# Patient Record
Sex: FEMALE | Race: OTHER | URBAN - METROPOLITAN AREA
[De-identification: names, ages, dates, MRNs, and addresses within clinical notes are randomized per-mention and may not be internally consistent; named-entity substitution may affect disease eponyms.]

---

## 2023-09-10 NOTE — PROGRESS NOTES
HISTORY OF PRESENT ILLNESS  Sourav Light is a 23 y.o. female. [facility] Atrioventricular repair 2003, 2012  ----  CARDIAC STUDIES  ----    Have you had Fatigue? No   2. Have you had have you had Chest Pain? Yes when sneezing if so how long month how frequent unsure . Is   it  substernal? no Pressure like? no Comes on with Activity or with large meals? No , pressure lasts seconds, and is about 3 after sneezing. 3.   Have you had Dyspnea (SOB) ? No  can you walk 2 blocks or a flight of stairs without SOB yes  4. Have you had Orthopnea? No   5. Have you had PND? No   6. Have you had leg swelling? unsure  7. Have you had any weight gain? No   8. Have you had any palpitations? No   9. Have you had any syncope? No   10. Do you have any wounds on legs? no  ----  2003, 2012 had atrioventricular surgical closures per patient [facility]    Discussed with the patient the above and is as such with the above modifications. No family history on file. No past medical history on file. No past surgical history on file. Social History     Tobacco Use    Smoking status: Not on file    Smokeless tobacco: Not on file   Substance Use Topics    Alcohol use: Not on file       Not on File    Prior to Admission medications    Not on File       No results found for: \"LIPIDPAN\", \"BMP\", \"CMP\"     There were no vitals taken for this visit. HPI    Review of Systems   Constitutional:  Negative for activity change, appetite change, diaphoresis, fatigue and unexpected weight change. Eyes:  Negative for visual disturbance. Respiratory:  Positive for chest tightness. Negative for apnea, cough, shortness of breath and wheezing. Cardiovascular:  Negative for chest pain, palpitations and leg swelling. Gastrointestinal:  Negative for abdominal pain, blood in stool, constipation, diarrhea and nausea. Endocrine: Negative for cold intolerance and heat intolerance.    Genitourinary:  Negative for decreased urine volume and

## 2023-09-15 ENCOUNTER — OFFICE VISIT (OUTPATIENT)
Age: 23
End: 2023-09-15
Payer: OTHER GOVERNMENT

## 2023-09-15 VITALS
BODY MASS INDEX: 32.39 KG/M2 | WEIGHT: 176 LBS | HEIGHT: 62 IN | HEART RATE: 66 BPM | DIASTOLIC BLOOD PRESSURE: 58 MMHG | OXYGEN SATURATION: 98 % | SYSTOLIC BLOOD PRESSURE: 111 MMHG

## 2023-09-15 DIAGNOSIS — R07.9 CHEST PAIN, UNSPECIFIED TYPE: Primary | ICD-10-CM

## 2023-09-15 PROCEDURE — 99204 OFFICE O/P NEW MOD 45 MIN: CPT | Performed by: INTERNAL MEDICINE

## 2023-09-15 RX ORDER — NITROFURANTOIN 25; 75 MG/1; MG/1
CAPSULE ORAL
COMMUNITY
Start: 2023-09-14

## 2023-09-15 RX ORDER — CETIRIZINE HYDROCHLORIDE 10 MG/1
TABLET ORAL
COMMUNITY
Start: 2023-09-14

## 2023-09-15 ASSESSMENT — PATIENT HEALTH QUESTIONNAIRE - PHQ9
SUM OF ALL RESPONSES TO PHQ QUESTIONS 1-9: 0
SUM OF ALL RESPONSES TO PHQ QUESTIONS 1-9: 0
DEPRESSION UNABLE TO ASSESS: FUNCTIONAL CAPACITY MOTIVATION LIMITS ACCURACY
SUM OF ALL RESPONSES TO PHQ9 QUESTIONS 1 & 2: 0
SUM OF ALL RESPONSES TO PHQ QUESTIONS 1-9: 0
2. FEELING DOWN, DEPRESSED OR HOPELESS: 0
1. LITTLE INTEREST OR PLEASURE IN DOING THINGS: 0
SUM OF ALL RESPONSES TO PHQ QUESTIONS 1-9: 0

## 2023-09-15 NOTE — PROGRESS NOTES
Have you had Fatigue? No   2. Have you had have you had Chest Pain? Yes when sneezing if so how long month how frequent unsure . Is   it  substernal? no Pressure like? no Comes on with Activity or with large meals? No   3. Have you had Dyspnea (SOB) ? No  can you walk 2 blocks or a flight of stairs without SOB yes  4. Have you had Orthopnea? No   5. Have you had PND? No   6. Have you had leg swelling? unsure  7. Have you had any weight gain? No   8. Have you had any palpitations? No   9. Have you had any syncope? No   10. Do you have any wounds on legs?  no

## 2023-09-16 ASSESSMENT — ENCOUNTER SYMPTOMS
DIARRHEA: 0
NAUSEA: 0
COLOR CHANGE: 0
BLOOD IN STOOL: 0
WHEEZING: 0
APNEA: 0
COUGH: 0
CONSTIPATION: 0
ABDOMINAL PAIN: 0
CHEST TIGHTNESS: 1
SHORTNESS OF BREATH: 0

## 2023-11-04 ASSESSMENT — ENCOUNTER SYMPTOMS
ABDOMINAL PAIN: 0
COLOR CHANGE: 0
NAUSEA: 0
APNEA: 0
SHORTNESS OF BREATH: 0
COUGH: 0
CONSTIPATION: 0
WHEEZING: 0
BLOOD IN STOOL: 0
DIARRHEA: 0

## 2023-11-04 NOTE — PROGRESS NOTES
HISTORY OF PRESENT ILLNESS  Lucina Hurt is a 23 y.o. female. [facility] Atrioventricular repair 2003, 2012  ----  CARDIAC STUDIES  ----  2d tte 9/21/2023    Left Ventricle: Normal left ventricular systolic function with a visually estimated EF of 55 - 60%. Left ventricle size is normal. Normal wall thickness. Normal wall motion. Mitral Valve: Moderate regurgitation.  ----  Exercise stress test 9/22/2023    ECG: Resting ECG demonstrates normal sinus rhythm, left anterior fascicular block and right bundle branch block. ECG: The ECG was negative for ischemia. Stress Test: Hemodynamics are adequate for diagnosis. Blood pressure demonstrated a normal response and heart rate demonstrated a normal response to stress. The patient's heart rate recovery was normal.  ----  2003, 2012 had atrioventricular surgical closures per patient [facility]    Have you had Fatigue? No       2. Have you had have you had Chest Pain? No      3. Have you had Dyspnea (SOB)? No      4. Have you had Orthopnea? No       5. Have you had PND? No      6. Have you had leg swelling? No         7. Have you had any weight gain? No     8. Have you had any palpitations? No      9. Have you had any syncope? No      10. Do you have any wounds on legs? No    Reviewed with the patient and is as such. No family history on file. No past medical history on file. No past surgical history on file. Social History     Tobacco Use    Smoking status: Never    Smokeless tobacco: Never   Substance Use Topics    Alcohol use: Never       No Known Allergies    Prior to Admission medications    Medication Sig Start Date End Date Taking?  Authorizing Provider   cetirizine (ZYRTEC) 10 MG tablet  9/14/23   ProviderSurinder MD   nitrofurantoin, macrocrystal-monohydrate, (MACROBID) 100 MG capsule  9/14/23   ProviderSurinder MD       No results found for: \"LIPIDPAN\", \"BMP\", \"CMP\"     There were no vitals taken for this

## 2023-11-10 ENCOUNTER — OFFICE VISIT (OUTPATIENT)
Age: 23
End: 2023-11-10

## 2023-11-10 VITALS
WEIGHT: 173.4 LBS | OXYGEN SATURATION: 98 % | HEIGHT: 62 IN | SYSTOLIC BLOOD PRESSURE: 118 MMHG | BODY MASS INDEX: 31.91 KG/M2 | DIASTOLIC BLOOD PRESSURE: 76 MMHG | HEART RATE: 79 BPM

## 2023-11-10 DIAGNOSIS — Z87.898 HISTORY OF CHEST PAIN: Primary | ICD-10-CM

## 2023-11-10 DIAGNOSIS — I34.0 MODERATE MITRAL REGURGITATION: ICD-10-CM

## 2023-11-10 ASSESSMENT — ENCOUNTER SYMPTOMS: CHEST TIGHTNESS: 0

## 2023-11-10 NOTE — PROGRESS NOTES
Have you had Fatigue? No      2. Have you had have you had Chest Pain? No     3. Have you had Dyspnea (SOB)? No     4. Have you had Orthopnea? No      5. Have you had PND? No     6. Have you had leg swelling? No     7. Have you had any weight gain? No    8. Have you had any palpitations? No     9. Have you had any syncope? No     10. Do you have any wounds on legs?  No

## 2023-11-10 NOTE — PATIENT INSTRUCTIONS
Learning About the [address] [place] [address] Diet  What is the Mediterranean diet? The Mediterranean diet is a style of eating rather than a diet plan. It features foods eaten in [facility], [place], [place] and [place], and other countries along the [facility]. It emphasizes eating foods like fish, fruits, vegetables, beans, high-fiber breads and whole grains, nuts, and olive oil. This style of eating includes limited red meat, cheese, and sweets. Why choose the Mediterranean diet? A Mediterranean-style diet may improve heart health. It contains more fat than other heart-healthy diets. But the fats are mainly from nuts, unsaturated oils (such as fish oils and olive oil), and certain nut or seed oils (such as canola, soybean, or flaxseed oil). These fats may help protect the heart and blood vessels. How can you get started on the Mediterranean diet? Here are some things you can do to switch to a more Mediterranean way of eating. What to eat  Eat a variety of fruits and vegetables each day, such as grapes, blueberries, tomatoes, broccoli, peppers, figs, olives, spinach, eggplant, beans, lentils, and chickpeas. Eat a variety of whole-grain foods each day, such as oats, brown rice, and whole wheat bread, pasta, and couscous. Eat fish at least 2 times a week. Try tuna, salmon, mackerel, lake trout, herring, or sardines. Eat moderate amounts of low-fat dairy products, such as milk, cheese, or yogurt. Eat moderate amounts of poultry and eggs. Choose healthy (unsaturated) fats, such as nuts, olive oil, and certain nut or seed oils like canola, soybean, and flaxseed. Limit unhealthy (saturated) fats, such as butter, palm oil, and coconut oil. And limit fats found in animal products, such as meat and dairy products made with whole milk. Try to eat red meat only a few times a month in very small amounts. Limit sweets and desserts to only a few times a week. This includes sugar-sweetened drinks like soda.   The

## 2023-11-14 ENCOUNTER — TELEPHONE (OUTPATIENT)
Age: 23
End: 2023-11-14

## 2023-11-14 NOTE — TELEPHONE ENCOUNTER
Reached out to pt. to inform Dr. Errol Burns is requesting medical records from   [facility] ~ pertaing to heart sx. Patient advised sx was performed at    [address]  T # [phone]  F# [phone]    She understands she will need to come to office to complete a medical release form. Per pt. Will come to office on Thursday to sign.
Splenic Laceration